# Patient Record
Sex: FEMALE | Race: WHITE | NOT HISPANIC OR LATINO | Employment: FULL TIME | URBAN - METROPOLITAN AREA
[De-identification: names, ages, dates, MRNs, and addresses within clinical notes are randomized per-mention and may not be internally consistent; named-entity substitution may affect disease eponyms.]

---

## 2023-06-11 PROBLEM — R79.89 LOW VITAMIN B12 LEVEL: Status: ACTIVE | Noted: 2023-06-11

## 2023-06-11 PROBLEM — K50.90 CROHN'S DISEASE (MULTI): Status: ACTIVE | Noted: 2022-09-07

## 2023-06-11 PROBLEM — R06.02 SHORTNESS OF BREATH: Status: ACTIVE | Noted: 2023-06-11

## 2023-06-11 PROBLEM — J34.2 DEVIATED NASAL SEPTUM: Status: ACTIVE | Noted: 2022-07-19

## 2023-06-11 PROBLEM — J30.9 ALLERGIC RHINITIS: Status: ACTIVE | Noted: 2022-09-07

## 2023-06-11 PROBLEM — O99.013 ANEMIA OF PREGNANCY IN THIRD TRIMESTER (HHS-HCC): Status: ACTIVE | Noted: 2023-04-19

## 2023-06-11 PROBLEM — K64.0 GRADE I HEMORRHOIDS: Status: ACTIVE | Noted: 2022-09-07

## 2023-06-11 PROBLEM — E88.09 HYPERPROTEINEMIA: Status: ACTIVE | Noted: 2023-06-11

## 2023-06-11 PROBLEM — Z86.39 H/O IRON DEFICIENCY: Status: ACTIVE | Noted: 2023-06-11

## 2023-06-11 PROBLEM — Z86.2 HISTORY OF ANEMIA: Status: ACTIVE | Noted: 2023-06-11

## 2023-06-11 PROBLEM — Z00.00 ROUTINE ADULT HEALTH MAINTENANCE: Status: ACTIVE | Noted: 2023-06-11

## 2023-06-11 PROBLEM — E55.9 VITAMIN D DEFICIENCY: Status: ACTIVE | Noted: 2018-01-09

## 2023-06-11 PROBLEM — F41.9 ANXIETY: Status: ACTIVE | Noted: 2017-05-05

## 2023-06-11 PROBLEM — R31.21 ASYMPTOMATIC MICROSCOPIC HEMATURIA: Status: ACTIVE | Noted: 2022-09-07

## 2023-07-10 ENCOUNTER — OFFICE VISIT (OUTPATIENT)
Dept: PRIMARY CARE | Facility: CLINIC | Age: 28
End: 2023-07-10
Payer: COMMERCIAL

## 2023-07-10 VITALS
SYSTOLIC BLOOD PRESSURE: 112 MMHG | HEIGHT: 69 IN | OXYGEN SATURATION: 99 % | HEART RATE: 70 BPM | BODY MASS INDEX: 23.55 KG/M2 | TEMPERATURE: 97.9 F | DIASTOLIC BLOOD PRESSURE: 75 MMHG | WEIGHT: 159 LBS

## 2023-07-10 DIAGNOSIS — R79.89 LOW VITAMIN B12 LEVEL: ICD-10-CM

## 2023-07-10 DIAGNOSIS — Z00.00 ROUTINE ADULT HEALTH MAINTENANCE: Primary | ICD-10-CM

## 2023-07-10 DIAGNOSIS — Z86.39 H/O IRON DEFICIENCY: ICD-10-CM

## 2023-07-10 DIAGNOSIS — K50.90 CROHN'S DISEASE WITHOUT COMPLICATION, UNSPECIFIED GASTROINTESTINAL TRACT LOCATION (MULTI): ICD-10-CM

## 2023-07-10 DIAGNOSIS — E55.9 VITAMIN D DEFICIENCY: ICD-10-CM

## 2023-07-10 DIAGNOSIS — L30.9 ECZEMA, UNSPECIFIED TYPE: ICD-10-CM

## 2023-07-10 PROBLEM — J34.2 DEVIATED NASAL SEPTUM: Status: RESOLVED | Noted: 2022-07-19 | Resolved: 2023-07-10

## 2023-07-10 PROBLEM — E88.09 HYPERPROTEINEMIA: Status: RESOLVED | Noted: 2023-06-11 | Resolved: 2023-07-10

## 2023-07-10 PROBLEM — R06.02 SHORTNESS OF BREATH: Status: RESOLVED | Noted: 2023-06-11 | Resolved: 2023-07-10

## 2023-07-10 PROBLEM — O99.013 ANEMIA OF PREGNANCY IN THIRD TRIMESTER (HHS-HCC): Status: RESOLVED | Noted: 2023-04-19 | Resolved: 2023-07-10

## 2023-07-10 PROCEDURE — 99395 PREV VISIT EST AGE 18-39: CPT | Performed by: INTERNAL MEDICINE

## 2023-07-10 PROCEDURE — 1036F TOBACCO NON-USER: CPT | Performed by: INTERNAL MEDICINE

## 2023-07-10 RX ORDER — NORGESTIMATE AND ETHINYL ESTRADIOL 7DAYSX3 28
1 KIT ORAL DAILY
COMMUNITY
End: 2023-07-10 | Stop reason: ALTCHOICE

## 2023-07-10 RX ORDER — ADALIMUMAB 40MG/0.4ML
40 KIT SUBCUTANEOUS
COMMUNITY

## 2023-07-10 RX ORDER — FLUOCINONIDE 0.5 MG/G
1 CREAM TOPICAL 2 TIMES DAILY
COMMUNITY
Start: 2021-04-20 | End: 2023-07-10 | Stop reason: SDUPTHER

## 2023-07-10 RX ORDER — ACETAMINOPHEN 500 MG
50 TABLET ORAL DAILY
COMMUNITY
Start: 2022-04-21

## 2023-07-10 RX ORDER — CLINDAMYCIN PHOSPHATE 10 MG/G
1 AEROSOL, FOAM TOPICAL DAILY
COMMUNITY
Start: 2020-11-24 | End: 2024-04-15 | Stop reason: ALTCHOICE

## 2023-07-10 RX ORDER — FLUOCINONIDE 0.5 MG/G
1 CREAM TOPICAL 2 TIMES DAILY
Qty: 30 G | Refills: 1 | Status: SHIPPED | OUTPATIENT
Start: 2023-07-10 | End: 2024-04-15 | Stop reason: WASHOUT

## 2023-07-10 RX ORDER — HYOSCYAMINE SULFATE 0.12 MG/1
125 TABLET SUBLINGUAL EVERY 4 HOURS PRN
COMMUNITY

## 2023-07-10 RX ORDER — ALBUTEROL SULFATE 90 UG/1
2 AEROSOL, METERED RESPIRATORY (INHALATION) EVERY 4 HOURS PRN
COMMUNITY
Start: 2021-04-20

## 2023-07-10 RX ORDER — OXYCODONE HYDROCHLORIDE 5 MG/1
5 TABLET ORAL EVERY 4 HOURS PRN
COMMUNITY
Start: 2023-05-12 | End: 2023-07-10 | Stop reason: ALTCHOICE

## 2023-07-10 ASSESSMENT — PATIENT HEALTH QUESTIONNAIRE - PHQ9
SUM OF ALL RESPONSES TO PHQ9 QUESTIONS 1 AND 2: 0
2. FEELING DOWN, DEPRESSED OR HOPELESS: NOT AT ALL
1. LITTLE INTEREST OR PLEASURE IN DOING THINGS: NOT AT ALL

## 2023-07-10 NOTE — PROGRESS NOTES
"Reason for Visit: Annual Physical Exam    Assessment and Plan:  Problem List Items Addressed This Visit          High    Routine adult health maintenance - Primary    Overview     Moderna COVID 19 vaccine 12/30/20, 1/29/21, 10/28/21, 5/25/22  TDAP 4/20/21, 3/8/23  Human Papillomavirus 9-valent Vaccine, [id number]/20/2017, 7/26/2017, 6/16/2017    Influenza Seasonal 10/16/2015, 10/1/21, 10/1/22         Cscope 7/19 (1-3yrs); 1/22 (2yrs)  MMR 10/28/2015  Hep C Ab (-) 2018  PAP 6/9/20 (-), 6/28/23 (-) sees GYN [facility]         Current Assessment & Plan     Annual Wellness exam completed   Preventive Health history reviewed:  Vaccines today: none  Labs ordered    Cscope managed by GI [facility]  Depression Screening done            Medium    Crohn's disease (CMS/HCC)    Overview     Managed by GI MD in [place], [place] in past  Managed by GI, Dr Fair ([facility])  On Humira. (Previously on Imuran)  Last Cscope 1/22 (-)  CT enterography 11/20 (-)  MRI 1/22: Short segment of active inflammation involving the terminal ileum. No stricture. Posterior midline transsphincteric perianal fistula with no branching or associated abscess.;           H/O iron deficiency    Overview      11/20: Level 36, 9%sat, TIBC 421 ([place])      Failed PO iron       Infusions in past         Low vitamin B12 level    Overview     11/20: 301  On MVIT now  has had IM B12 in past (doesnt tolerate oral supplement well)         Vitamin D deficiency    Overview     Formatting of this note might be different from the original. Comment on above: 11/20 37On supplement; Comment on above: 11/20 37On supplement;          HPI: Saw ENT and Pulm last year  S/p septoplasty and symptoms got better  Then got pregnant  Symptoms better now    ROS otherwise negative aside from what was mentioned above in HPI.    Vitals  /75   Pulse 70   Temp 36.6 °C (97.9 °F)   Ht 1.753 m (5' 9\")   Wt 72.1 kg (159 lb)   SpO2 99%   BMI 23.48 kg/m²   Body mass index is 23.48 kg/m².  Physical " Exam  Gen: Alert, NAD  HEENT:  Normal exam  Neck:  No masses/nodes palpable; Thyroid nontender and without nodules; No CARLEE  Respiratory:  Lungs CTAB  CV: RRR  Neuro:  Gross motor and sensory intact  Skin:  No suspicious lesions present, has raised red rash on right lateral foot and right palm, not pruritic  Breast: No masses or axillary lymphadenopathy  GYN deferred    Active Problem List  Patient Active Problem List   Diagnosis    Routine adult health maintenance    Allergic rhinitis    Anemia of pregnancy in third trimester    Anxiety    Asymptomatic microscopic hematuria    Crohn's disease (CMS/HCC)    Hyperproteinemia    Grade I hemorrhoids    History of anemia    Low vitamin B12 level    Vitamin D deficiency    H/O iron deficiency       Comprehensive Medical/Surgical/Social/Family History  Past Medical History:   Diagnosis Date    H/O chest x-ray     4/21: NORMAL    H/O CT scan     11/2020 [facility] CT enterography: negative    H/O CT scan of chest     8/2/21: 1. Normal study    H/O diagnostic ultrasound     5/27/21: US kidney: IMPRESSION:     Negative ultrasound of the kidneys    H/O magnetic resonance imaging     1/22: Short segment of active inflammation involving the terminal ileum. No stricture.      Posterior midline transsphincteric perianal fistula with no branching or associated     abscess.    H/O pelvic ultrasound     2017:  normal    History of PFTs     2021: Normal, DLCO mildly elevated. assisted (-)     Past Surgical History:   Procedure Laterality Date    COLONOSCOPY  04/21/2022 1/22: The entire examined colon is normal.           - Erythematous mucosa at the ileocecal valve with           single erosion. Biopsied.           - Nodularity with singe aphthae in the terminal           ileum.           - Simple Endoscopic Score for Crohn's Disease: 3,           mucosal inflammatory changes.   Overall, endoscopic remission on Humira 40 mg weekly with good levels 7/19: Normal    CYSTOSCOPY   2021:      Cystoscopy was completed today without complication and she tolerated the procedure     well. The introitus and urethra are normal. Careful inspection of the bladder     revealed there to be no evidence of tumor, stones, foreign bodies or diverticula. There     is clear efflux from each orifice. As she has negative workup, she will repeat a     UA in 1 year with her PCP to monitor    NASAL ENDOSCOPY  2022: , rigid endoscopy was performed using a 0degree endoscope. The septum was     deviated. There was anterior septal and inferior turbinate crusting, but there was no     crusting further back. Inferior turbinates were hypertrophic bilaterally. Both MT were in     good position and the middle meatii were clear. No mucus, pus or polyps were     seen    OTHER SURGICAL HISTORY  2020    [place] tooth extraction    OTHER SURGICAL HISTORY  2020    s/p Crohn's abscess drained    SEPTOPLASTY       Social History     Social History Narrative     (Hammad Callahan)    1 son    PT at [facility]    Nonsmoker    Social ETOH    ---    Family History:    F: Crohns, Melanoma    M: Lung CA small cell ( 32)    S: Anxiety/Depression    B: Crohns       Allergies and Medications  Patient has no known allergies.  Current Outpatient Medications   Medication Instructions    albuterol 90 mcg/actuation inhaler 2 puffs, inhalation, Every 4 hours PRN    cholecalciferol (VITAMIN D-3) 50 mcg, oral, Daily    clindamycin phosphate foam 1 Application, Topical, Daily    fluocinonide 0.05 % cream 1 Application, Topical, 2 times daily    Humira(CF) 40 mg, subcutaneous    hyoscyamine (LEVSIN/SL) 125 mcg, sublingual, Every 4 hours PRN    oxyCODONE (ROXICODONE) 5 mg, oral, Every 4 hours PRN    -iron fum-folic acid 29 mg iron- 1 mg tablet 1 tablet, oral, Daily

## 2023-07-12 ENCOUNTER — TELEPHONE (OUTPATIENT)
Dept: PRIMARY CARE | Facility: CLINIC | Age: 28
End: 2023-07-12
Payer: COMMERCIAL

## 2023-11-01 ENCOUNTER — PATIENT MESSAGE (OUTPATIENT)
Dept: PRIMARY CARE | Facility: CLINIC | Age: 28
End: 2023-11-01
Payer: COMMERCIAL

## 2023-11-01 DIAGNOSIS — R31.21 ASYMPTOMATIC MICROSCOPIC HEMATURIA: Primary | ICD-10-CM

## 2024-04-15 ENCOUNTER — OFFICE VISIT (OUTPATIENT)
Dept: PRIMARY CARE | Facility: CLINIC | Age: 29
End: 2024-04-15
Payer: COMMERCIAL

## 2024-04-15 VITALS
SYSTOLIC BLOOD PRESSURE: 96 MMHG | WEIGHT: 141 LBS | TEMPERATURE: 97.9 F | HEIGHT: 68 IN | BODY MASS INDEX: 21.37 KG/M2 | DIASTOLIC BLOOD PRESSURE: 63 MMHG | OXYGEN SATURATION: 97 % | HEART RATE: 68 BPM

## 2024-04-15 DIAGNOSIS — B35.3 TINEA PEDIS OF BOTH FEET: ICD-10-CM

## 2024-04-15 DIAGNOSIS — L01.00 IMPETIGO: Primary | ICD-10-CM

## 2024-04-15 PROCEDURE — 99213 OFFICE O/P EST LOW 20 MIN: CPT | Performed by: NURSE PRACTITIONER

## 2024-04-15 PROCEDURE — 1036F TOBACCO NON-USER: CPT | Performed by: NURSE PRACTITIONER

## 2024-04-15 RX ORDER — ERYTHROMYCIN 5 MG/G
OINTMENT OPHTHALMIC
COMMUNITY
Start: 2024-04-06

## 2024-04-15 RX ORDER — KETOCONAZOLE 20 MG/G
CREAM TOPICAL 2 TIMES DAILY
Qty: 30 G | Refills: 2 | Status: SHIPPED | OUTPATIENT
Start: 2024-04-15 | End: 2025-04-15

## 2024-04-15 RX ORDER — CEPHALEXIN 250 MG/1
250 CAPSULE ORAL 4 TIMES DAILY
Qty: 28 CAPSULE | Refills: 0 | Status: SHIPPED | OUTPATIENT
Start: 2024-04-15 | End: 2024-04-22

## 2024-04-15 RX ORDER — MUPIROCIN 20 MG/G
OINTMENT TOPICAL 3 TIMES DAILY
Qty: 22 G | Refills: 0 | Status: SHIPPED | OUTPATIENT
Start: 2024-04-15 | End: 2024-04-25

## 2024-04-15 ASSESSMENT — PATIENT HEALTH QUESTIONNAIRE - PHQ9
1. LITTLE INTEREST OR PLEASURE IN DOING THINGS: NOT AT ALL
2. FEELING DOWN, DEPRESSED OR HOPELESS: NOT AT ALL
SUM OF ALL RESPONSES TO PHQ9 QUESTIONS 1 AND 2: 0

## 2024-04-15 NOTE — PROGRESS NOTES
"Subjective   Patient ID: Lorrie Callahan is a 28 y.o. female who presents for Sick Visit (/).    Bilateral Rash behind the ears x 2 weeks  Very itchy, discharge   Yellow on pillow  Winter time had cracked   dry skin   used Aquaphor, neosporin  Neosporin seemed to help  Breastfeeding  Son has infected salivary gland    Rash on feet  Itchy  Derm thought maybe eczema  Didn't give any cream    On humira        Review of Systems  ROS completely negative except what was mentioned in the HPI.  Problem List, surgical, social, and family histories which were reviewed and updated as necessary within the EMR. I also personally reviewed the notes, labs, and imaging that pertained to what was documented or discussed in the HPI.    Objective   Physical Exam  Vitals and nursing note reviewed.   Constitutional:       General: She is not in acute distress.     Appearance: Normal appearance. She is normal weight.   HENT:      Head: Normocephalic and atraumatic.      Right Ear: External ear normal.      Left Ear: External ear normal.      Nose: Nose normal.      Mouth/Throat:      Mouth: Mucous membranes are moist.   Eyes:      Extraocular Movements: Extraocular movements intact.      Conjunctiva/sclera: Conjunctivae normal.   Pulmonary:      Effort: Pulmonary effort is normal.   Musculoskeletal:      Cervical back: Normal range of motion and neck supple.   Skin:     Comments: Erythematous rash with yellow crust on bilateral ears;  Erythematous rash on bilateral lateral feet with scaling   Neurological:      General: No focal deficit present.      Mental Status: She is alert and oriented to person, place, and time. Mental status is at baseline.   Psychiatric:         Mood and Affect: Mood normal.         Behavior: Behavior normal.         Thought Content: Thought content normal.         Judgment: Judgment normal.         BP 96/63 (BP Location: Left arm)   Pulse 68   Temp 36.6 °C (97.9 °F) (Temporal)   Ht 1.727 m (5' 8\")   Wt " 64 kg (141 lb)   SpO2 97%   BMI 21.44 kg/m²     Assessment/Plan    Problem List Items Addressed This Visit    None  Visit Diagnoses       Impetigo    -  Primary    Relevant Medications    cephalexin (Keflex) 250 mg capsule    mupirocin (Bactroban) 2 % ointment    Tinea pedis of both feet        Relevant Medications    ketoconazole (NIZOral) 2 % cream

## 2024-04-30 ENCOUNTER — APPOINTMENT (OUTPATIENT)
Dept: PRIMARY CARE | Facility: CLINIC | Age: 29
End: 2024-04-30
Payer: COMMERCIAL

## 2024-07-15 ENCOUNTER — TELEPHONE (OUTPATIENT)
Dept: PRIMARY CARE | Facility: CLINIC | Age: 29
End: 2024-07-15
Payer: COMMERCIAL

## 2024-07-15 NOTE — TELEPHONE ENCOUNTER
Spoke with patient and relayed message regarding PT through Brittni Giordano @ [employer].  Patient verbally understood.

## 2024-07-29 ENCOUNTER — OFFICE VISIT (OUTPATIENT)
Dept: PRIMARY CARE | Facility: CLINIC | Age: 29
End: 2024-07-29
Payer: COMMERCIAL

## 2024-07-29 VITALS
TEMPERATURE: 99.8 F | DIASTOLIC BLOOD PRESSURE: 73 MMHG | BODY MASS INDEX: 21.47 KG/M2 | HEART RATE: 87 BPM | SYSTOLIC BLOOD PRESSURE: 104 MMHG | WEIGHT: 141.2 LBS | OXYGEN SATURATION: 97 %

## 2024-07-29 DIAGNOSIS — J06.9 UPPER RESPIRATORY TRACT INFECTION, UNSPECIFIED TYPE: Primary | ICD-10-CM

## 2024-07-29 LAB — POC RAPID STREP: NEGATIVE

## 2024-07-29 PROCEDURE — 87081 CULTURE SCREEN ONLY: CPT

## 2024-07-29 PROCEDURE — 99213 OFFICE O/P EST LOW 20 MIN: CPT | Performed by: NURSE PRACTITIONER

## 2024-07-29 PROCEDURE — 1036F TOBACCO NON-USER: CPT | Performed by: NURSE PRACTITIONER

## 2024-07-29 PROCEDURE — 87635 SARS-COV-2 COVID-19 AMP PRB: CPT

## 2024-07-29 PROCEDURE — 87880 STREP A ASSAY W/OPTIC: CPT | Performed by: NURSE PRACTITIONER

## 2024-07-29 RX ORDER — BENZONATATE 100 MG/1
100 CAPSULE ORAL 3 TIMES DAILY PRN
Qty: 30 CAPSULE | Refills: 0 | Status: SHIPPED | OUTPATIENT
Start: 2024-07-29 | End: 2024-08-08

## 2024-07-29 RX ORDER — FLUTICASONE PROPIONATE 50 MCG
1 SPRAY, SUSPENSION (ML) NASAL DAILY
Qty: 16 G | Refills: 5 | Status: SHIPPED | OUTPATIENT
Start: 2024-07-29 | End: 2024-07-30 | Stop reason: SDUPTHER

## 2024-07-29 NOTE — PROGRESS NOTES
"Problem List Items Addressed This Visit    None  Visit Diagnoses       Upper respiratory tract infection, unspecified type    -  Primary    rapid strep neg  with hypertrophy sent cx to confirm  covid sent  - paxlovid if pos (no breastfeeding with; msg pharm)  cont conserv mgt given short duration sx    Relevant Medications    benzonatate (Tessalon) 100 mg capsule    fluticasone (Flonase) 50 mcg/actuation nasal spray    Other Relevant Orders    POCT Rapid Strep A manually resulted (Completed)    Group A Streptococcus, Culture    Sars-CoV-2 PCR             Subjective   Patient ID: Lorrie Callahan is a 28 y.o. female who presents for Sick Visit (Cough /congestion x 4 days ear pain right more than left/Last night took robitussin advil on and off she is breast feeding /Took covid test Wednesday night was negative/).  HPI   was ill last week  Son runny nose  Family with colds  Everyone coming back covid negative  Hasn't used albuterol  Dyspnea      Review of Systems   All other systems reviewed and are negative.      BP Readings from Last 3 Encounters:   07/29/24 104/73   04/15/24 96/63   07/10/23 112/75      Wt Readings from Last 3 Encounters:   07/29/24 64 kg (141 lb 3.2 oz)   04/15/24 64 kg (141 lb)   07/10/23 72.1 kg (159 lb)      BMI:   Estimated body mass index is 21.47 kg/m² as calculated from the following:    Height as of 4/15/24: 1.727 m (5' 8\").    Weight as of this encounter: 64 kg (141 lb 3.2 oz).    Objective   Physical Exam  Constitutional:       General: She is not in acute distress.  HENT:      Head: Normocephalic and atraumatic.      Right Ear: Tympanic membrane and external ear normal.      Left Ear: Tympanic membrane and external ear normal.      Nose: Nose normal.      Mouth/Throat:      Mouth: Mucous membranes are moist.      Pharynx: Oropharyngeal exudate and posterior oropharyngeal erythema present.      Comments: 2+ bilat hypertrophy   Eyes:      Extraocular Movements: Extraocular " movements intact.      Conjunctiva/sclera: Conjunctivae normal.   Cardiovascular:      Rate and Rhythm: Normal rate and regular rhythm.      Pulses: Normal pulses.   Pulmonary:      Effort: Pulmonary effort is normal.      Breath sounds: Normal breath sounds.      Comments: Frequent cough   Musculoskeletal:         General: Normal range of motion.      Cervical back: Normal range of motion and neck supple.   Skin:     General: Skin is warm and dry.   Neurological:      General: No focal deficit present.      Mental Status: She is alert.   Psychiatric:         Mood and Affect: Mood normal.

## 2024-07-30 ENCOUNTER — APPOINTMENT (OUTPATIENT)
Dept: PRIMARY CARE | Facility: CLINIC | Age: 29
End: 2024-07-30
Payer: COMMERCIAL

## 2024-07-30 DIAGNOSIS — J06.9 UPPER RESPIRATORY TRACT INFECTION, UNSPECIFIED TYPE: ICD-10-CM

## 2024-07-30 LAB — SARS-COV-2 RNA RESP QL NAA+PROBE: NOT DETECTED

## 2024-07-30 RX ORDER — FLUTICASONE PROPIONATE 50 MCG
1 SPRAY, SUSPENSION (ML) NASAL DAILY
Qty: 48 G | Refills: 3 | Status: SHIPPED | OUTPATIENT
Start: 2024-07-30 | End: 2025-07-30

## 2024-07-31 LAB — S PYO THROAT QL CULT: NORMAL

## 2024-08-29 RX ORDER — DESONIDE 0.5 MG/G
1 CREAM TOPICAL 2 TIMES DAILY
COMMUNITY
Start: 2024-05-03

## 2024-08-29 RX ORDER — TACROLIMUS 1 MG/G
1 OINTMENT TOPICAL 2 TIMES DAILY
COMMUNITY
Start: 2024-05-20

## 2024-08-29 RX ORDER — ADALIMUMAB-ADAZ 40 MG/.4ML
40 INJECTION, SOLUTION SUBCUTANEOUS WEEKLY
COMMUNITY
Start: 2024-05-13 | End: 2025-05-13

## 2024-08-29 RX ORDER — CLINDAMYCIN PHOSPHATE 10 MG/G
1 AEROSOL, FOAM TOPICAL 3 TIMES WEEKLY
COMMUNITY
Start: 2024-04-17

## 2024-08-29 RX ORDER — TRIAMCINOLONE ACETONIDE 1 MG/G
CREAM TOPICAL
COMMUNITY
Start: 2024-07-18

## 2024-08-29 RX ORDER — CLOBETASOL PROPIONATE 0.5 MG/G
1 OINTMENT TOPICAL 2 TIMES DAILY
COMMUNITY
Start: 2024-04-17

## 2024-08-30 ENCOUNTER — APPOINTMENT (OUTPATIENT)
Dept: PRIMARY CARE | Facility: CLINIC | Age: 29
End: 2024-08-30
Payer: COMMERCIAL

## 2024-08-30 ENCOUNTER — LAB (OUTPATIENT)
Dept: LAB | Facility: LAB | Age: 29
End: 2024-08-30
Payer: COMMERCIAL

## 2024-08-30 VITALS
TEMPERATURE: 98.1 F | DIASTOLIC BLOOD PRESSURE: 56 MMHG | SYSTOLIC BLOOD PRESSURE: 88 MMHG | WEIGHT: 141 LBS | BODY MASS INDEX: 20.19 KG/M2 | HEIGHT: 70 IN | HEART RATE: 71 BPM | OXYGEN SATURATION: 99 %

## 2024-08-30 DIAGNOSIS — J30.9 ALLERGIC RHINITIS, UNSPECIFIED SEASONALITY, UNSPECIFIED TRIGGER: ICD-10-CM

## 2024-08-30 DIAGNOSIS — M54.6 THORACIC SPINE PAIN: ICD-10-CM

## 2024-08-30 DIAGNOSIS — Z00.00 ROUTINE ADULT HEALTH MAINTENANCE: Primary | ICD-10-CM

## 2024-08-30 DIAGNOSIS — R31.21 ASYMPTOMATIC MICROSCOPIC HEMATURIA: ICD-10-CM

## 2024-08-30 DIAGNOSIS — M54.2 CERVICAL PAIN (NECK): ICD-10-CM

## 2024-08-30 DIAGNOSIS — K50.90 CROHN'S DISEASE WITHOUT COMPLICATION, UNSPECIFIED GASTROINTESTINAL TRACT LOCATION (MULTI): ICD-10-CM

## 2024-08-30 DIAGNOSIS — L40.9 PSORIASIS: ICD-10-CM

## 2024-08-30 LAB
APPEARANCE UR: CLEAR
BILIRUB UR STRIP.AUTO-MCNC: NEGATIVE MG/DL
COLOR UR: COLORLESS
GLUCOSE UR STRIP.AUTO-MCNC: NORMAL MG/DL
HOLD SPECIMEN: NORMAL
KETONES UR STRIP.AUTO-MCNC: NEGATIVE MG/DL
LEUKOCYTE ESTERASE UR QL STRIP.AUTO: NEGATIVE
NITRITE UR QL STRIP.AUTO: NEGATIVE
PH UR STRIP.AUTO: 6.5 [PH]
PROT UR STRIP.AUTO-MCNC: NEGATIVE MG/DL
RBC # UR STRIP.AUTO: NEGATIVE /UL
SP GR UR STRIP.AUTO: 1
UROBILINOGEN UR STRIP.AUTO-MCNC: NORMAL MG/DL

## 2024-08-30 PROCEDURE — 1036F TOBACCO NON-USER: CPT | Performed by: INTERNAL MEDICINE

## 2024-08-30 PROCEDURE — 3008F BODY MASS INDEX DOCD: CPT | Performed by: INTERNAL MEDICINE

## 2024-08-30 PROCEDURE — 99395 PREV VISIT EST AGE 18-39: CPT | Performed by: INTERNAL MEDICINE

## 2024-08-30 PROCEDURE — 81003 URINALYSIS AUTO W/O SCOPE: CPT

## 2024-08-30 RX ORDER — CYCLOBENZAPRINE HCL 10 MG
10 TABLET ORAL 3 TIMES DAILY PRN
Qty: 30 TABLET | Refills: 0 | Status: SHIPPED | OUTPATIENT
Start: 2024-08-30 | End: 2024-10-29

## 2024-08-30 ASSESSMENT — PATIENT HEALTH QUESTIONNAIRE - PHQ9
SUM OF ALL RESPONSES TO PHQ9 QUESTIONS 1 AND 2: 0
1. LITTLE INTEREST OR PLEASURE IN DOING THINGS: NOT AT ALL
2. FEELING DOWN, DEPRESSED OR HOPELESS: NOT AT ALL

## 2024-08-30 NOTE — PATIENT INSTRUCTIONS
Gallieri Test/Grail test  Tests for these cancers:  Adrenal Cortical Carcinoma  Ampulla of Vater  Anus  Appendix, Carcinoma  Bile Ducts, Distal  Bile Ducts, Intrahepatic  Bile Ducts, Perihilar  Bladder, Urinary  Bone  Breast  Cervix  Colon and Rectum  Esophagus and Esophagogastric Junction  Gallbladder  Gastrointestinal Stromal Tumor  Gestational Trophoblastic Neoplasms  Kidney  Larynx  Leukemia  Liver  Lung  Lymphoma (Hodgkin and Non-Hodgkin)  Melanoma of the Skin  Merkel Cell Carcinoma  Mesothelioma, Malignant Pleural  Nasal Cavity and Paranasal Sinuses Nasopharynx  Neuroendocrine Tumors of the Appendix  Neuroendocrine Tumors of the Colon and Rectum  Neuroendocrine Tumors of the Pancreas  Oral Cavity  Oropharynx (HPV-Mediated, p16+)  Oropharynx (p16-) and Hypopharynx  Ovary, Fallopian Tube and Primary Peritoneum  Pancreas, exocrine  Penis  Plasma Cell Myeloma and Plasma Cell Disorders  Prostate  Small Intestine  Soft Tissue Sarcoma of the Abdomen and Thoracic Visceral Organs  Soft Tissue Sarcoma of the Head and Neck  Soft Tissue Sarcoma of the Retroperitoneum  Soft Tissue Sarcoma of the Trunk and Extremities  Soft Tissue Sarcoma Unusual Histologies and Sites  Stomach  Testis  Ureter, Renal Pelvis  Uterus, Carcinoma and Carcinosarcoma  Uterus, Sarcoma  Vagina  Vulva

## 2024-08-30 NOTE — PROGRESS NOTES
ANNUAL CPE VISIT  Chief Complaint   Patient presents with    Annual Exam     HPI: Has Crohns, had scope done and MRI abd  Reviewed  Stil having chest pain and neck pain  Scaleness are tight  Anterior chest is tender  Ribs are rotated  Saw PT and Chiro, had XRAYS neck and chest  Doesnt think is TOS  Constant and persistent pain  Thoracic spine rotation also, pain on left  Family hx malgnany young age    Assessment and Plan:  Problem List Items Addressed This Visit          High    Routine adult health maintenance - Primary    Overview     Moderna COVID 19 vaccine 12/30/20, 1/29/21, 10/28/21, 5/25/22, 11/20/23  TDAP 4/20/21, 3/8/23  Human Papillomavirus 9-valent Vaccine, [id number]/20/2017, 7/26/2017, 6/16/2017    Influenza Seasonal 10/16/2015, 10/1/21, 10/1/22         Cscope 7/19 (1-3yrs); 1/22 (2yrs)  MMR 10/28/2015  Hep C Ab (-) 2018  PAP 6/9/20 (-), 6/28/23 (-) sees GYN [facility]         Current Assessment & Plan     Annual Wellness exam completed   Preventive Health History reviewed  Ordered:   Labs                Medium    Allergic rhinitis    Overview     On Clairitn/Zyrtec;         Asymptomatic microscopic hematuria    Overview     5/21 US kidney (-)  6/21 Cystoscopy (-)  Saw Urology, rec'd annual UA         Relevant Orders    Urinalysis with Reflex Microscopic    Crohn's disease (Multi)    Overview     Managed by GI MD in [place], [place] in past  Managed by GI, Dr Fair ([facility])  On Humira. (Previously on Imuran)  Last Cscope 1/22 (-)  CT enterography 11/20 (-)  MRI 1/22: Short segment of active inflammation involving the terminal ileum. No stricture. Posterior midline transsphincteric perianal fistula with no branching or associated abscess.  Flex Sig 5/24: Perianal fistula found on perianal exam.                          - Eroded and granular mucosa in the rectum.                          Biopsied.: Chronic mildly active colitis                         - Aphtha in the ileum, 5 cm from the ileocecal                     "      valve. Biopsied. Chronic inactive enteritis                         - Simple Endoscopic Score for Crohn's Disease: 4,                          mucosal inflammatory changes secondary to Crohn's                          disease, with ileitis and colitis.          Psoriasis    Overview     Sees Derm  Topical agents          Other Visit Diagnoses       Cervical pain (neck)        Would avoid NSAIDs  prn muscle relaxant  MRI if sx persist    Relevant Medications    cyclobenzaprine (Flexeril) 10 mg tablet    Other Relevant Orders    MR cervical spine wo IV contrast    Thoracic spine pain        Rib subluxation? as cause for pain  vs  Thoracic spine rotation  XRAys normal  Will get MRI    Relevant Orders    MR thoracic spine wo IV contrast              ROS otherwise negative aside from what was mentioned above in HPI.    Vitals  BP 88/56   Pulse 71   Temp 36.7 °C (98.1 °F)   Ht 1.765 m (5' 9.5\")   Wt 64 kg (141 lb)   SpO2 99%   BMI 20.52 kg/m²   Body mass index is 20.52 kg/m².  Physical Exam  Gen: Alert, NAD  HEENT:  Normal exam  Neck:  No masses/nodes palpable; Thyroid nontender and without nodules; No CARLEE  Respiratory:  Lungs CTAB  CV: RRR  Neuro:  Gross motor and sensory intact  Skin:  No suspicious lesions present  Breast: No masses or axillary lymphadenopathy      Allergies and Medications  Pollen extracts  Current Outpatient Medications   Medication Instructions    adalimumab-adaz 40 mg, subcutaneous, Weekly    albuterol 90 mcg/actuation inhaler 2 puffs, inhalation, Every 4 hours PRN    cholecalciferol (VITAMIN D-3) 50 mcg, oral, Daily    clindamycin phosphate foam 1 Application, Topical, 3 times weekly    clobetasol (Temovate) 0.05 % ointment 1 Application, Topical, 2 times daily    cyclobenzaprine (FLEXERIL) 10 mg, oral, 3 times daily PRN    desonide (DesOwen) 0.05 % cream 1 Application, Topical, 2 times daily, Take weekends off. Do not use on face, armpits, neck or groin.    fluticasone (Flonase) 50 " mcg/actuation nasal spray 1 spray, Each Nostril, Daily, Shake gently. Before first use, prime pump. After use, clean tip and replace cap.    hyoscyamine (LEVSIN/SL) 125 mcg, sublingual, Every 4 hours PRN    -iron fum-folic acid 29 mg iron- 1 mg tablet 1 tablet, oral, Daily    tacrolimus (Protopic) 0.1 % ointment 1 Application, Topical, 2 times daily, On days not using topical steroid.    triamcinolone (Kenalog) 0.1 % cream Apply to affected area twice daily as needed. Use 2 weeks on 1 week off. Not for face, armpits or groin.       Active Problem List  Patient Active Problem List   Diagnosis    Routine adult health maintenance    Allergic rhinitis    Anxiety    Asymptomatic microscopic hematuria    Crohn's disease (Multi)    Grade I hemorrhoids    History of anemia    Low vitamin B12 level    Vitamin D deficiency    H/O iron deficiency    Psoriasis       Comprehensive Medical/Surgical/Social/Family History  Past Medical History:   Diagnosis Date    H/O chest x-ray     4/21: NORMAL    H/O CT scan     11/2020 [facility] CT enterography: negative    H/O CT scan of chest     8/2/21: 1. Normal study    H/O diagnostic ultrasound     5/27/21: US kidney: IMPRESSION:     Negative ultrasound of the kidneys    H/O magnetic resonance imaging     1/22: Short segment of active inflammation involving the terminal ileum. No stricture.      Posterior midline transsphincteric perianal fistula with no branching or associated     abscess.    H/O magnetic resonance imaging 07/2024    No active inflammatory small bowel Crohn's disease.    H/O pelvic ultrasound     2017:  normal    History of PFTs     2021: Normal, DLCO mildly elevated. pen[facility]ent[facility] (-)     Past Surgical History:   Procedure Laterality Date    COLONOSCOPY  04/21/2022 1/22: The entire examined colon is normal.           - Erythematous mucosa at the ileocecal valve with           single erosion. Biopsied.           - Nodularity with singe aphthae in the terminal            ileum.           - Simple Endoscopic Score for Crohn's Disease: 3,           mucosal inflammatory changes.   Overall, endoscopic remission on Humira 40 mg weekly with good levels 7/19: Normal    COLONOSCOPY  04/25/2024    Perianal fistula found on perianal exam.                         - Eroded and granular mucosa in the rectum.                         Biopsied.                         - Aphtha in the ileum, 5 cm from the ileocecal                         valve. Biopsied.                         - Simple Endoscopic Score for Crohn's Disease: 4,    CYSTOSCOPY  06/04/2021 6/21:      Cystoscopy was completed today without complication and she tolerated the procedure     well. The introitus and urethra are normal. Careful inspection of the bladder     revealed there to be no evidence of tumor, stones, foreign bodies or diverticula. There     is clear efflux from each orifice. As she has negative workup, she will repeat a     UA in 1 year with her PCP to monitor    FLEXIBLE SIGMOIDOSCOPY  05/06/2024    Perianal fistula found on perianal exam.                         - Eroded and granular mucosa in the rectum.                         Biopsied.                         - Aphtha in the ileum, 5 cm from the ileocecal                         valve. Biopsied.     - Simple Endoscopic Score for Crohn's Disease: 4, mucosal inflammatory changes secondary to Crohn's disease, with ileitis and colitis.    NASAL ENDOSCOPY  04/21/2022 2021: , rigid endoscopy was performed using a 0degree endoscope. The septum was     deviated. There was anterior septal and inferior turbinate crusting, but there was no     crusting further back. Inferior turbinates were hypertrophic bilaterally. Both MT were in     good position and the middle meatii were clear. No mucus, pus or polyps were     seen    OTHER SURGICAL HISTORY  01/13/2020    [place] tooth extraction    OTHER SURGICAL HISTORY  01/13/2020    s/p Crohn's abscess drained     How Severe Is Your Skin Lesion?: mild Have Your Skin Lesions Been Treated?: not been treated SEPTOPLASTY         Social History     Social History Narrative    Scial History:     (Hammad Callahan)    1 son    PT at [facility]    Nonsmoker    Social ETOH    ---    Family History:    F: Crohns, Melanoma    M: Lung CA small cell ( 32)    S: Anxiety/Depression    B: Crohns      Is This A New Presentation, Or A Follow-Up?: Skin Lesions

## 2024-09-27 PROBLEM — R04.0 EPISTAXIS: Status: ACTIVE | Noted: 2024-09-27

## 2024-11-04 PROBLEM — Z87.09 H/O PNEUMOTHORAX: Status: ACTIVE | Noted: 2024-11-04

## 2024-11-06 ENCOUNTER — PATIENT OUTREACH (OUTPATIENT)
Dept: PRIMARY CARE | Facility: CLINIC | Age: 29
End: 2024-11-06
Payer: COMMERCIAL

## 2024-11-06 NOTE — PROGRESS NOTES
Discharge facility: [facility]  Discharge diagnosis: Spontaneous pneumothorax   Admission date: 11/3/24  Discharge date: 11/5/24    PCP Appointment Date: Unsuccessful attempts x2 to reach patient for PCP Follow-up, message sent to office  Specialist Appointment Date: 12/4/24 pulmonologist  Hospital Encounter and Summary: Linked      Two attempts were made to reach patient within two business days after discharge. Voicemail left with contact information for patient to call back with any non-emergent questions or concerns.

## 2024-11-19 ENCOUNTER — APPOINTMENT (OUTPATIENT)
Dept: PRIMARY CARE | Facility: CLINIC | Age: 29
End: 2024-11-19
Payer: COMMERCIAL

## 2024-11-19 VITALS
WEIGHT: 142.2 LBS | SYSTOLIC BLOOD PRESSURE: 109 MMHG | TEMPERATURE: 98.3 F | DIASTOLIC BLOOD PRESSURE: 77 MMHG | OXYGEN SATURATION: 97 % | BODY MASS INDEX: 20.7 KG/M2 | HEART RATE: 83 BPM

## 2024-11-19 DIAGNOSIS — Z87.09 H/O PNEUMOTHORAX: Primary | ICD-10-CM

## 2024-11-19 PROCEDURE — 1036F TOBACCO NON-USER: CPT | Performed by: NURSE PRACTITIONER

## 2024-11-19 PROCEDURE — 99495 TRANSJ CARE MGMT MOD F2F 14D: CPT | Performed by: NURSE PRACTITIONER

## 2024-11-19 RX ORDER — ALBUTEROL SULFATE 90 UG/1
2 INHALANT RESPIRATORY (INHALATION) EVERY 4 HOURS PRN
Qty: 18 G | Refills: 11 | Status: SHIPPED | OUTPATIENT
Start: 2024-11-19 | End: 2025-11-19

## 2024-11-19 NOTE — PROGRESS NOTES
"Patient: Lorrie Callahan  : 1995  PCP: Shannon Lomax MD  MRN: [id number]  Program: No linked episodes    Admit date:  2024  Discharge date:  2024  Discharge DX:  Pneumothorax    Lorrie Callahan is a 29 y.o. female presenting today for follow-up after being discharged from the hospital 14 days ago. The main problem requiring admission was pneumothorax. The discharge summary and/or Transitional Care Management documentation was reviewed. Medication reconciliation was performed as indicated via the \"Ruel as Reviewed\" timestamp.     Lorrie Callahan was contacted by Transitional Care Management services two days after her discharge. This encounter and supporting documentation was reviewed.    The complexity of medical decision making for this patient's transitional care is moderate.    Still feeling slightly short of breath  CXR shows resolution of pneumothorax  In past used albuterol  And wants refill  Waiting for next menses  Feels shifting in lungs and SOB with menses  Has a son 18 month old  Has not been diagnosed with endometriosis  Doing incentive spirometer         Hospital course copied:     Lorrie Callahan is a 29 year old female presented with past medical history of shortness of breath and right sided chest discomfort for the past several months that correlate at the timing of her menses. CXR and CT imaging showed a small right sided pneumothorax. Pigtail was placed, pulmonary consulted, chest tube was clamped today and x-ray was repeated showed stable small apical pneumothorax, chest tube was removed, cleared for discharge by pulmonary and repeat x-ray in 1 week, to follow-up outpatient for pulmonary function test, patient will follow-up with GYN for evaluation of endometriosis, patient scheduled the appointment today remained clinically and vitally stable for discharge today.     Labs copied:    COMPLETE BLOOD COUNT  Order: [id number]  Component  Ref Range & Units 2 wk ago "   WBC  3.70 - 11.00 k/uL 5.89   RBC  3.90 - 5.20 m/uL 4.04   Hemoglobin  11.5 - 15.5 g/dL 11.8   Hematocrit  36.0 - 46.0 % 37.0   MCV  80.0 - 100.0 fL 91.6   MCH  26.0 - 34.0 pg 29.2   MCHC  30.5 - 36.0 g/dL 31.9   RDW-CV  11.5 - 15.0 % 12.5   Platelet Count  150 - 400 k/uL 227   MPV  9.0 - 12.7 fL 10.6   Absolute nRBC  <0.01 k/uL <0.01   Resulting Agency [facility]     Specimen Collected: 11/04/24 06:05                ntains abnormal data COMPREHENSIVE METABOLIC PANEL  Order: [id number]  Component  Ref Range & Units 2 wk ago   Protein, Total  6.3 - 8.0 g/dL 7.3   Albumin  3.9 - 4.9 g/dL 3.9   Calcium, Total  8.5 - 10.2 mg/dL 9.0   Bilirubin, Total  0.2 - 1.3 mg/dL 0.4   Alkaline Phosphatase  34 - 123 U/L 60   AST  13 - 35 U/L 14   ALT  7 - 38 U/L 7   Glucose  74 - 99 mg/dL 102 High    Comment: The American Diabetes Association (ADA) provides guidance for cutoff values for fasting glucose and random glucose. The ADA defines fasting as no caloric intake for at least 8 hours. Fasting plasma glucose results between 100 to 125 mg/dL indicate increased risk for diabetes (prediabetes).  Fasting plasma glucose results greater than or equal to 126 mg/dL meet the criteria for diagnosis of diabetes. In the absence of unequivocal hyperglycemia, results should be confirmed by repeat testing. In a patient with classic symptoms of hyperglycemia or hyperglycemic crisis, random plasma glucose results greater than or equal to 200 mg/dL meet the criteria for diagnosis of diabetes.  Reference: Standards of Medical Care in Diabetes 2016, American Diabetes Association. Diabetes Care. 2016.39(Suppl 1).   BUN  7 - 21 mg/dL 8   Creatinine  0.58 - 0.96 mg/dL 0.62   Sodium  136 - 144 mmol/L 139   Potassium  3.7 - 5.1 mmol/L 3.5 Low    Chloride  98 - 107 mmol/L 105   CO2  22 - 30 mmol/L 24   Anion Gap  8 - 15 mmol/L 10   Estimated Glomerular Filtration Rate  >=60 mL/min/1.73m² 122   Comment: Estimated Glomerular Filtration Rate  (eGFR) is calculated using the 2021 CKD-EPI creatinine equation. This equation utilizes serum creatinine, sex, and age as parameters. The creatinine assay has traceable calibration to isotope dilution-mass spectrometry. Refer to KDIGO guidelines for clinical interpretation. In patients with unstable renal function, e.g. those with acute kidney injury, the eGFR may not accurately reflect actual GFR.   Resulting Agency [facility]     Specimen Collected: 11/04/24 06:05              See additional [facility] labs under tab      Imaging copied:     CT angio chest w and wo IV contrast  Order: [id number]  Impression    IMPRESSION:    1.  Small right-sided pneumothorax.    2.  No pulmonary emboli.    3.  Trace right pleural effusion      : PSCB    Transcribe Date/Time: Nov  3 2024 12:26P    Dictated by : ANKUR GALLO MD    This examination was interpreted and the report reviewed and  electronically signed by:  ANKUR GALLO MD on Nov  3 2024 12:39PM  EST  Narrative    * * *Final Report* * *    DATE OF EXAM: Nov  3 2024 10:58AM      [facility]   0564  -  CTA CHEST (NON GATED) W IVCON PE  / ACCESSION #  [id number]    PROCEDURE REASON: Pulmonary embolism (PE) suspected, high prob        * * * * Physician Interpretation * * * *     EXAMINATION:  CHEST CTA (NON GATED) WITH CONTRAST (PULMONARY EMBOLISM  PROTOCOL)    Clinical History: Pulmonary embolism suspected    Technique:  Spiral CT acquisition of the chest from the thoracic inlet to  the upper abdomen following IV contrast.  Axial 1 and 3 mm thick slices  plus coronal and sagittal reformatted images.  MQ:  CTCP_5  Contrast:  80 mL Omnipaque 350 IV  CT Radiation dose: Integrated Dose-length product (DLP) for this visit =    241 mGy*cm  CT Dose Reduction Employed: Automated exposure control(AEC) and iterative  recon    CTA:  Post-processed images (Maximum intensity Projection (MIP),  Volume-rendered (VR), or Surface shaded display images (SSD))  were  created, reviewed and archived.      Comparison:  Chest x-ray 11/03/2024    RESULT:    Limitations:  None.    Evaluation for thromboembolic disease:       - Right heart chambers:  No thromboembolic disease.       - Main pulmonary arteries:  No thromboembolic disease.       - Lobar pulmonary arteries:  No thromboembolic disease.       - Segmental pulmonary arteries:  No thromboembolic disease.       - Subsegmental pulmonary arteries:  No thromboembolic disease.       - Additional pulmonary artery findings:  The main pulmonary artery  is normal in caliber.    Lines, tubes, and devices:  None.    Lung parenchyma and airways: Small right-sided pneumothorax.  No focal  consolidation.    Pleural space:  Trace right pleural effusion.    Lower neck, lymph nodes, and mediastinum:  The imaged thyroid gland is  normal.  No axillary, mediastinal, or hilar lymphadenopathy.  Small  amount of ill-defined soft tissue in anterior mediastinum, likely  residual thymic tissue.    Heart, pericardium, and thoracic vessels:  The thoracic aorta is normal  in caliber. The cardiac chambers are normal in size. No coronary artery  atherosclerotic calcifications are noted, although the study is not  optimized for coronary assessment. No pericardial effusion or thickening.    Bones and soft tissues:  No suspicious osseous lesions.    Upper abdomen:  No abnormality in the imaged upper abdomen.    Localizer images: No additional findings.  Exam End: 11/03/24 10:58    Specimen Collected: 11/03/24 10:58 Last Resulted: 11/03/24 12:41   Received From: [facility]  Result Received: 11/04/24 05:23       XR chest 1 view  Order: [id number]  Impression    IMPRESSION:    Small right apical pneumothorax is unchanged.      : QUINTON    Transcribe Date/Time: Nov 5 2024  2:30P    Dictated by : ANKUR GALLO MD    This examination was interpreted and the report reviewed and  electronically signed by:  ANKUR GALLO MD on Nov 5 2024   2:31PM  EST  Narrative    * * *Final Report* * *    DATE OF EXAM: Nov 5 2024  1:28PM      [facility]   5290  -  XR CHEST 1V FRONTAL   / ACCESSION #  [id number]    PROCEDURE REASON: Pneumothorax        * * * * Physician Interpretation * * * *     EXAMINATION: XR CHEST 1V FRONTAL    CLINICAL HISTORY: Pneumothorax      COMPARISON: X-ray 11/04/2024      RESULT:    Lines, tubes, and devices: Right-sided chest tube.    Lungs and pleura: Small right hydropneumothorax.  No pleural effusions or  focal consolidation.    Cardiomediastinal silhouette: Normal in size.    Other: No acute abnormality detected in the upper abdomen or visualized  bones.  Exam End: 11/05/24 13:28    Specimen Collected: 11/05/24 13:28 Last Resulted: 11/05/24 14:33   Received From: [facility]  Result Received: 11/13/24 16:17     Review of Systems  ROS completely negative except what was mentioned in the HPI.  Problem List, surgical, social, and family histories which were reviewed and updated as necessary within the EMR. I also personally reviewed the notes, labs, and imaging that pertained to what was documented or discussed in the HPI.    Physical Exam  Vitals and nursing note reviewed.   Constitutional:       General: She is not in acute distress.     Appearance: Normal appearance.   HENT:      Head: Normocephalic and atraumatic.      Right Ear: External ear normal.      Left Ear: External ear normal.      Nose: Nose normal.      Mouth/Throat:      Mouth: Mucous membranes are moist.   Eyes:      Extraocular Movements: Extraocular movements intact.      Conjunctiva/sclera: Conjunctivae normal.      Pupils: Pupils are equal, round, and reactive to light.   Cardiovascular:      Rate and Rhythm: Normal rate and regular rhythm.      Heart sounds: Normal heart sounds.   Pulmonary:      Effort: Pulmonary effort is normal.      Breath sounds: Normal breath sounds.   Musculoskeletal:         General: Normal range of motion.      Cervical back: Normal range  of motion and neck supple.   Skin:     General: Skin is warm and dry.   Neurological:      General: No focal deficit present.      Mental Status: She is alert and oriented to person, place, and time. Mental status is at baseline.   Psychiatric:         Mood and Affect: Mood normal.         Behavior: Behavior normal.         Thought Content: Thought content normal.         Judgment: Judgment normal.         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  Small right-sided pneumothorax.   2.  No pulmonary emboli.   3.  Trace right pleural effusion   Consider Di?n?gest to prevent recurrence         Current Assessment & Plan     Reviewed hospital notes, labs, and scans  Recent repeat CXR shows resolution of pneumothorax  24 Chest X-ray IMPRESSION:   Unremarkable exam with no evidence of acute radiographic abnormality and resolution of the previously seen small right pneumothorax.   [facility] - has appt with Dr Dorothy Sparks 24  [facility] - has appt with Dr Mata 24         Relevant Medications    albuterol 90 mcg/actuation inhaler        Family History   Problem Relation Name Age of Onset    No Known Problems Mother          F: Crohns, Melanoma     M: Lung CA small cell ( 32)     S: Anxiety/Depression     B: Crohns       No data recorded    No follow-ups on file.

## 2024-11-19 NOTE — ASSESSMENT & PLAN NOTE
Reviewed hospital notes, labs, and scans  Recent repeat CXR shows resolution of pneumothorax  11/12/24 Chest X-ray IMPRESSION:   Unremarkable exam with no evidence of acute radiographic abnormality and resolution of the previously seen small right pneumothorax.   [facility]ul[facility] - has appt with Dr Dorothy Sparks 12/4/24  [facility] - has appt with Dr Mata 12/18/24

## 2024-11-20 ENCOUNTER — PATIENT OUTREACH (OUTPATIENT)
Dept: PRIMARY CARE | Facility: CLINIC | Age: 29
End: 2024-11-20
Payer: COMMERCIAL

## 2024-11-25 ENCOUNTER — HOSPITAL ENCOUNTER (OUTPATIENT)
Dept: RADIOLOGY | Facility: EXTERNAL LOCATION | Age: 29
Discharge: HOME | End: 2024-11-25
Payer: COMMERCIAL

## 2024-11-25 DIAGNOSIS — R07.9 CHEST PAIN, UNSPECIFIED TYPE: ICD-10-CM

## 2025-01-21 ENCOUNTER — PATIENT OUTREACH (OUTPATIENT)
Dept: PRIMARY CARE | Facility: CLINIC | Age: 30
End: 2025-01-21
Payer: COMMERCIAL

## 2025-01-21 PROBLEM — N80.9 ENDOMETRIOSIS: Status: ACTIVE | Noted: 2025-01-21

## 2025-01-21 NOTE — PROGRESS NOTES
Discharge facility:[facility]  Discharge diagnosis: Right pneumothorax     Admission date: 1/16/25  Discharge date: 1/21/25    PCP Appointment Date: 1/22/25 with Charmaine  Specialist Appointment Date: 1/23/25 pulmonary, 1/28/25 GI, 2/17/25 derm,3-3-25 GYN  Hospital Encounter and Summary: Linked    See Discharge assessment below for further details    Medications  Medications reviewed with patient/caregiver?: Yes (1/21/2025 10:14 AM)  Is the patient having any side effects they believe may be caused by any medication additions or changes?: No (1/21/2025 10:14 AM)  Does the patient have all medications ordered at discharge?: Yes (1/21/2025 10:14 AM)  Care Management Interventions: Provided patient education (1/21/2025 10:14 AM)  Prescription Comments: no new prescriptions sent-see not below (1/21/2025 10:14 AM)  Is the patient taking all medications as directed (includes completed medication regime)?: Yes (1/21/2025 10:14 AM)  Care Management Interventions: Advised patient to call provider (Patient states she thought they were calling in birth control pills for her. She will call GYN today and try to clarify) (1/21/2025 10:14 AM)  Medication Comments: no new or changed medications (1/21/2025 10:14 AM)  Follow Up Tasks: -- (script for birth control or did OB/GYN send in?) (1/21/2025 10:14 AM)    Appointments  Does the patient have a primary care provider?: Yes (1/21/2025 10:14 AM)  Care Management Interventions: Verified appointment date/time/provider (TCM scheduled) (1/21/2025 10:14 AM)  Has the patient kept scheduled appointments due by today?: Yes (1/21/2025 10:14 AM)  Care Management Interventions: Advised patient to keep appointment; Educated on importance of keeping appointment (1/21/2025 10:14 AM)  Follow Up Tasks: -- (n/a) (1/21/2025 10:14 AM)    Self Management  What is the home health agency?: n/a (1/21/2025 10:14 AM)  Has home health visited the patient within 72 hours of discharge?:  Not applicable (1/21/2025 10:14 AM)  Home Health Interventions: -- (n/a) (1/21/2025 10:14 AM)  What Durable Medical Equipment (DME) was ordered?: n/a (1/21/2025 10:14 AM)  Has all Durable Medical Equipment (DME) been delivered?: -- (n/a) (1/21/2025 10:14 AM)  DME Interventions: -- (n/a) (1/21/2025 10:14 AM)  Care Management Interventions: Notified PCP/provider (1/21/2025 10:14 AM)  Follow Up Tasks: -- (n/a) (1/21/2025 10:14 AM)    Patient Teaching  Does the patient have access to their discharge instructions?: Yes (1/21/2025 10:14 AM)  Care Management Interventions: Reviewed instructions with patient (1/21/2025 10:14 AM)  What is the patient's perception of their health status since discharge?: Improving (1/21/2025 10:14 AM)  Is the patient/caregiver able to teach back the hierarchy of who to call/visit for symptoms/problems? PCP, Specialist, Home Health nurse, Urgent Care, ED, 911: Yes (1/21/2025 10:14 AM)  Patient/Caregiver Education Comments: Instructed on hospital discharge instructions. Instructed on new and changed medications. Instructed if increased shortness of breath or chest pains to call 911. Provided my contact information and encouraged to call with any questions (1/21/2025 10:14 AM)    Wrap Up  Wrap Up Additional Comments: Spoke with patient. She denies any further shortness of breath. She states she is feeling better. She thought birth control pills were going to be sent for her but they were not. Encouraged her to call [facility]/gyn to inquire and she can discuss further at tomorrow's appt.  She preferred appt tomorrow for follow up at PCP office. She also has MRI scheduled for tomorrow afternoon. She has no questions or concerns at this time. (1/21/2025 10:14 AM)

## 2025-01-22 ENCOUNTER — OFFICE VISIT (OUTPATIENT)
Dept: PRIMARY CARE | Facility: CLINIC | Age: 30
End: 2025-01-22
Payer: COMMERCIAL

## 2025-01-22 VITALS
DIASTOLIC BLOOD PRESSURE: 68 MMHG | TEMPERATURE: 97.8 F | HEART RATE: 83 BPM | OXYGEN SATURATION: 98 % | WEIGHT: 141 LBS | BODY MASS INDEX: 21.37 KG/M2 | HEIGHT: 68 IN | SYSTOLIC BLOOD PRESSURE: 101 MMHG

## 2025-01-22 DIAGNOSIS — Z78.9 TRANSITION OF CARE: Primary | ICD-10-CM

## 2025-01-22 DIAGNOSIS — Z87.09 H/O PNEUMOTHORAX: ICD-10-CM

## 2025-01-22 DIAGNOSIS — N80.9 ENDOMETRIOSIS: ICD-10-CM

## 2025-01-22 PROCEDURE — 3008F BODY MASS INDEX DOCD: CPT | Performed by: NURSE PRACTITIONER

## 2025-01-22 PROCEDURE — 99496 TRANSJ CARE MGMT HIGH F2F 7D: CPT | Performed by: NURSE PRACTITIONER

## 2025-01-22 PROCEDURE — 1036F TOBACCO NON-USER: CPT | Performed by: NURSE PRACTITIONER

## 2025-01-22 RX ORDER — NORETHINDRONE 5 MG/1
5 TABLET ORAL
COMMUNITY
Start: 2025-01-20 | End: 2025-01-22 | Stop reason: SDUPTHER

## 2025-01-22 RX ORDER — NORETHINDRONE 5 MG/1
TABLET ORAL
Qty: 100 TABLET | Refills: 3 | Status: SHIPPED | OUTPATIENT
Start: 2025-01-22 | End: 2025-01-24

## 2025-01-22 ASSESSMENT — PATIENT HEALTH QUESTIONNAIRE - PHQ9
1. LITTLE INTEREST OR PLEASURE IN DOING THINGS: NOT AT ALL
SUM OF ALL RESPONSES TO PHQ9 QUESTIONS 1 AND 2: 0
2. FEELING DOWN, DEPRESSED OR HOPELESS: NOT AT ALL

## 2025-01-22 NOTE — PATIENT INSTRUCTIONS
[facility]    Confirm dosing with Dr Adolfo Logan take 5 mg/day for 14 days; increase at increments of 2.5 mg/day every 2 weeks to reach 15 mg/day; continue for 6 months

## 2025-01-22 NOTE — PROGRESS NOTES
"Patient: Lorrie Callahan  : 1995  PCP: Shannon Lomax MD  MRN: [id number]  Program: Transitional Care Management  Status: Enrolled  Effective Dates: 2025 - present  Responsible Staff: Danyell Oates RN  Social Drivers to be Addressed: Alcohol Use, Financial Resource Strain, Physical Activity, Social Connections, Stress         Lorrie Callahan is a 29 y.o. female presenting today for follow-up after being discharged from the hospital 2 days ago. The main problem requiring admission was Pneumothorax  . The discharge summary and/or Transitional Care Management documentation was reviewed. Medication reconciliation was performed as indicated via the \"Ruel as Reviewed\" timestamp.     Lorrie Callahan was contacted by Transitional Care Management services two days after her discharge. This encounter and supporting documentation was reviewed.    Right upper chest pain to shoulder and scapula  Dull 2-3/10  Always present  MRI was done too late at hospital  Pneumothorax resolved  Did not receive Aygestin by [facility]  Is going to try and get pregnant this month  If unsuccessful, start aygestin  Wants another child    Hospital course copied:  This is a 29 year old female who presents with with pmh of crohns, pneumothorax, and anemia presents to  from outpatient office for recurrent RIGHT pneumothorax on CT accompanied by chest pain. Patient complains of chest pain episodes during times of menses. Previous pneumothorax in 2024 requiring chest tube followed by resolution of pneumothroax. Patient currently with menses. Patient following up with pulmonology to discuss CT chest which showed \"SMALL RECURRENT RIGHT HYDROPNEUMOTHORAX WITH ASSOCIATED MILD RIGHT BASILAR ATELECTASIS/INFILTRATE, NEW SINCE 2024\". CXR in ED with RIGHT apical pneumothorax 3cm. This AM CXR with improved pneumothorax of 2.1 cm pneumothorax. Patient currently stable on 3-4L NC. Patient reports she hasn't followed up witht OB/GYN. "     1/18 - Patient on 3-4L NC in no acute distress. CXR continues to show daily improvement. Currently 1.5 cm pneumo upon personal measurement this AM. Patient awaiting MRI chest    1/19 - Patient in no acute distress on 3L NC. Awaiting AM CXR and MRI chest. Will discharge after MRI if performed today    1/20: JONAS overnight. Pneumothorax resolved on CXR. MRI completed this morning. Patient medically cleared for discharge. Patient to discharge home with no needs  * What were the Active Issues: n/a  * Hospital Course Complicated by: n/a  * Extended Hospital Stay Due to: inability to obtain MRI  * Specific Medication Changes: none  * Surgical Pathology/Microbiology: none  * Pain: stable  * Surgical Incisions/ Wounds: none  * Tubes/Lines/Drains on Discharge: none  * Patient Condition at Discharge: Stable  * Disposition:    Home with Self Care    Labs Copied:  See [facility] labs    Imaging Copied:    CT chest wo IV contrast  Order: [id number]  Impression    IMPRESSION:    SMALL RECURRENT RIGHT HYDROPNEUMOTHORAX WITH ASSOCIATED MILD RIGHT  BASILAR ATELECTASIS/INFILTRATE, NEW SINCE 12/05/2024.    COMMUNICATION:  COMMUNICATED WITH JANET COLVIN ON 1/16/2025 10:12 AM    VIA EPIC STAFF MESSAGE OR PHONE MESSAGE BY IMAGING SERVICES NAVIGATOR.    RESULT:    Limitations:  None.    Lines, tubes, and devices:  None.    Lung parenchyma and Pleural space: There is small recurrent right  hydropneumothorax with associated mild right basilar  atelectasis/infiltrate, new since 12/05/2024.    Lower neck, lymph nodes, and mediastinum:  The imaged thyroid gland is  normal.  No lymphadenopathy in the supraclavicular, axillary,  mediastinal, or hilar regions.    Heart, pericardium, and thoracic vessels:  The thoracic aorta and main  pulmonary artery are normal in caliber. The cardiac chambers are normal  in size. No coronary artery atherosclerotic calcifications are noted,  although the study is not optimized for coronary assessment. No  pericardial  "effusion or thickening.    Bones and soft tissues:  No destructive bone lesion. Chest wall is  unremarkable.    Upper abdomen:  No abnormality in the imaged upper abdomen.    Localizer images: No additional findings.  Exam End: 01/16/25 08:23    Specimen Collected: 01/16/25 08:23 Last Resulted: 01/16/25 10:34       MRI CHEST WALL/RIB WO/W IVCON RIGHT  Order: [id number]  Impression    IMPRESSION:    Trace right pleural effusion.  Interval resolution of right pneumothorax.   Otherwise no definite intrathoracic endometriosis identified.    Result:    No discrete endometrial deposits identified in the thorax including the  diaphragm.    The chest wall is normal.  Trace right pleural effusion.  Limited imaging  through the lungs otherwise reveals no gross abnormalities.    No significant lymphadenopathy is noted.    The cardiac chamber sizes are normal.The thoracic aorta is normal in  size. The arch vessel branching pattern is normal, and all of the arch  branch vessels appear widely patent in their proximal portions.  The  central pulmonary arteries are normal.  No pericardial effusion is noted.    Limited imaging through the upper abdomen reveals no abnormalities of the  visualized organs.  Exam End: 01/20/25 11:07    Specimen Collected: 01/20/25 11:07 Last Resulted: 01/20/25 11:53   [facility] From[facility]  Result Received: 01/21/25 07:23     Review of Systems  ROS completely negative except what was mentioned in the HPI.  Problem List, surgical, social, and family histories which were reviewed and updated as necessary within the EMR. I also personally reviewed the notes, labs, and imaging that pertained to what was documented or discussed in the HPI.    /68   Pulse 83   Temp 36.6 °C (97.8 °F) (Temporal)   Ht 1.727 m (5' 8\")   Wt 64 kg (141 lb)   SpO2 98%   BMI 21.44 kg/m²     Physical Exam  Vitals and nursing note reviewed.   Constitutional:       General: She is not in acute distress.     " Appearance: Normal appearance.   HENT:      Head: Normocephalic and atraumatic.      Right Ear: External ear normal.      Left Ear: External ear normal.      Nose: Nose normal.      Mouth/Throat:      Mouth: Mucous membranes are moist.   Eyes:      Extraocular Movements: Extraocular movements intact.      Conjunctiva/sclera: Conjunctivae normal.      Pupils: Pupils are equal, round, and reactive to light.   Cardiovascular:      Rate and Rhythm: Normal rate and regular rhythm.      Heart sounds: Normal heart sounds.   Pulmonary:      Effort: Pulmonary effort is normal.      Breath sounds: Normal breath sounds.   Musculoskeletal:         General: Normal range of motion.      Cervical back: Normal range of motion and neck supple.   Skin:     General: Skin is warm and dry.   Neurological:      General: No focal deficit present.      Mental Status: She is alert and oriented to person, place, and time. Mental status is at baseline.   Psychiatric:         Mood and Affect: Mood normal. Affect is tearful.         Behavior: Behavior normal.         Thought Content: Thought content normal.         Judgment: Judgment normal.         The complexity of medical decision making for this patient's transitional care is moderate.    Assessment/Plan   Problem List Items Addressed This Visit       Endometriosis    Overview     H/O Catemenial Pneumothorax, multiple  S/p [facility] care conference 1/25: Recommendations from the group include:  -suppression with aygestin 5mg for 3 months  -then can attempt to conceive  -with another CP, she might need evaluation for surgical management  -given MRI chest has evaluated diaphragm, MRI pelvis will suffice          Current Assessment & Plan     Did not receive aygestin  Reordered and sent  She will follow up with them on dosing - Aygestin take 5 mg/day for 14 days; increase at increments of 2.5 mg/day every 2 weeks to reach 15 mg/day; continue for 6 months  Discussed counseling         Relevant  Medications    norethindrone (Aygestin) 5 mg tablet    H/O pneumothorax    Overview     Likely catamenial pneumothorax (pneumothorax occurring in association with menses due to thoracic endometriosis )    Ctchest 11/24: 1.  Small right-sided pneumothorax. 2.  No pulmonary emboli. 3.  Trace right pleural effusion      Consider Di?n?gest to prevent recurrence  Ctchest 1/16/25: SMALL RECURRENT RIGHT HYDROPNEUMOTHORAX WITH ASSOCIATED MILD RIGHT BASILAR ATELECTASIS/INFILTRATE, NEW SINCE 12/05/2024.           Other Visit Diagnoses       Transition of care    -  Primary    Hospital notes, labs & scans reviewed as well as other officen notes pertaining to the diagnosis

## 2025-01-22 NOTE — ASSESSMENT & PLAN NOTE
Did not receive aygestin  Reordered and sent  She will follow up with them on dosing - Aygestin take 5 mg/day for 14 days; increase at increments of 2.5 mg/day every 2 weeks to reach 15 mg/day; continue for 6 months  Discussed counseling

## 2025-01-24 ENCOUNTER — TELEPHONE (OUTPATIENT)
Dept: PRIMARY CARE | Facility: CLINIC | Age: 30
End: 2025-01-24
Payer: COMMERCIAL

## 2025-01-24 NOTE — TELEPHONE ENCOUNTER
Per SF NP I called Pt to ask if Dr. Mata is going to do Prior Auth for [place] (norethindrone (Aygestin) 5 mg tablet) 5 mg or if she needs us to complete one for her. Pt stated that she will reach out to Dr. Mata today and call office back to relay if she will or if we need to.

## 2025-02-04 ENCOUNTER — PATIENT OUTREACH (OUTPATIENT)
Dept: PRIMARY CARE | Facility: CLINIC | Age: 30
End: 2025-02-04
Payer: COMMERCIAL

## 2025-02-04 NOTE — PROGRESS NOTES
Call regarding appt. with PCP on  1/22/25 after hospitalization.  At time of outreach call the patient feels as if their condition has improved . She states she is not experiencing no abnormal shortness of breath.   Reviewed the PCP appointment with the pt and addressed any questions or concerns.

## 2025-03-12 ENCOUNTER — PATIENT OUTREACH (OUTPATIENT)
Dept: PRIMARY CARE | Facility: CLINIC | Age: 30
End: 2025-03-12
Payer: COMMERCIAL

## 2025-04-09 ENCOUNTER — PATIENT OUTREACH (OUTPATIENT)
Dept: PRIMARY CARE | Facility: CLINIC | Age: 30
End: 2025-04-09
Payer: COMMERCIAL

## 2025-06-30 DIAGNOSIS — R04.0 NASAL BLEEDING: ICD-10-CM

## 2025-06-30 DIAGNOSIS — Z98.890 H/O OF NASAL CAUTERIZATION: ICD-10-CM

## 2025-07-17 ENCOUNTER — APPOINTMENT (OUTPATIENT)
Dept: RADIOLOGY | Facility: HOSPITAL | Age: 30
End: 2025-07-17
Payer: COMMERCIAL

## 2025-07-17 DIAGNOSIS — M54.2 CERVICAL PAIN (NECK): ICD-10-CM

## 2025-07-17 DIAGNOSIS — M54.6 THORACIC SPINE PAIN: ICD-10-CM

## 2025-07-17 PROCEDURE — 72141 MRI NECK SPINE W/O DYE: CPT

## 2025-07-17 PROCEDURE — 72146 MRI CHEST SPINE W/O DYE: CPT

## 2025-07-21 DIAGNOSIS — M46.90 AXIAL SPONDYLOARTHRITIS: Primary | ICD-10-CM

## 2025-07-21 DIAGNOSIS — F41.9 ANXIETY: ICD-10-CM

## 2025-07-21 DIAGNOSIS — E55.9 VITAMIN D DEFICIENCY: ICD-10-CM

## 2025-07-21 DIAGNOSIS — Z00.00 ROUTINE ADULT HEALTH MAINTENANCE: ICD-10-CM

## 2025-07-21 PROBLEM — D18.09 HEMANGIOMA OF SPINE: Status: ACTIVE | Noted: 2025-07-21

## 2025-07-21 PROBLEM — M50.90 CERVICAL DISC DISEASE: Status: ACTIVE | Noted: 2025-07-21

## 2025-07-22 DIAGNOSIS — D18.09 HEMANGIOMA OF SPINE: ICD-10-CM

## 2025-07-28 ENCOUNTER — APPOINTMENT (OUTPATIENT)
Dept: OTOLARYNGOLOGY | Facility: CLINIC | Age: 30
End: 2025-07-28
Payer: COMMERCIAL

## 2025-07-28 VITALS — BODY MASS INDEX: 23.04 KG/M2 | WEIGHT: 152 LBS | HEIGHT: 68 IN

## 2025-07-28 DIAGNOSIS — R04.0 EPISTAXIS: Primary | ICD-10-CM

## 2025-07-28 PROCEDURE — 3008F BODY MASS INDEX DOCD: CPT | Performed by: OTOLARYNGOLOGY

## 2025-07-28 PROCEDURE — 30901 CONTROL OF NOSEBLEED: CPT | Performed by: OTOLARYNGOLOGY

## 2025-07-28 RX ORDER — MUPIROCIN 20 MG/G
OINTMENT TOPICAL
Qty: 22 G | Refills: 2 | Status: SHIPPED | OUTPATIENT
Start: 2025-07-28 | End: 2025-08-27

## 2025-07-28 NOTE — PROGRESS NOTES
Subjective   Patient ID: Lorrie Callahan is a 29 y.o. female who presents for No chief complaint on file..    HPI  New patient being seen for epistaxis. This has been present on left side. All remaining head neck inquiry otherwise negative.     Review of Systems   Constitutional: Negative.    HENT: Negative.     Respiratory: Negative.     Cardiovascular: Negative.    Neurological: Negative.      Physical Exam  General appearance: No acute distress. Normal facies. Symmetric facial movement. No gross lesions of the face are noted.  Ears:  The external ear structures appear normal. The ear canals patent and the tympanic membranes are intact without evidence of air-fluid levels, retraction, or congenital defects.    Nose:  Anterior rhinoscopy notes essentially a midline nasal septum with evidence of historical bleed left. Examination is noted for normal healthy mucosal membranes without any evidence of lesions, polyps, or exudate.   Throat/Oral mucosa:  The tongue is normally mobile. There are no lesions on the gingiva, buccal, or oral mucosa. There are no oral cavity masses.  Neck:  The neck is negative for mass lymphadenopathy. The trachea and parotid are clear. The thyroid bed is grossly unremarkable. The salivary gland structures are grossly unremarkable.    Procedure: Nasal cautery  Indication: Epistaxis  Risks, benefits, alternatives, and expectations discussed with patient and patient wishes to proceed.    After topical Lidocaine/Afrin was applied, prominent vessels at the left anterior nasal septum were cauterized using silver nitrate. Bacitracin ointment was then applied. Patient tolerated procedure well. No active bleeding noted following procedure.    Assessment/Plan   Epistaxis, cauterized using silver nitrate today. Patient encouraged to avoid blowing nose, sneeze with mouth open for next 4-5 days. Recommend follow-up with me as needed.

## 2025-08-01 ENCOUNTER — SPECIALTY PHARMACY (OUTPATIENT)
Dept: PHARMACY | Facility: CLINIC | Age: 30
End: 2025-08-01

## 2025-08-01 DIAGNOSIS — R73.09 ABNORMAL GLUCOSE: ICD-10-CM

## 2025-08-01 LAB
25(OH)D3+25(OH)D2 SERPL-MCNC: 65 NG/ML (ref 30–100)
ALBUMIN SERPL-MCNC: 4.3 G/DL (ref 3.6–5.1)
ALP SERPL-CCNC: 37 U/L (ref 31–125)
ALT SERPL-CCNC: 9 U/L (ref 6–29)
ANION GAP SERPL CALCULATED.4IONS-SCNC: 11 MMOL/L (CALC) (ref 7–17)
APPEARANCE UR: CLEAR
AST SERPL-CCNC: 12 U/L (ref 10–30)
BACTERIA #/AREA URNS HPF: ABNORMAL /HPF
BASOPHILS # BLD AUTO: 41 CELLS/UL (ref 0–200)
BASOPHILS NFR BLD AUTO: 0.5 %
BILIRUB SERPL-MCNC: 0.4 MG/DL (ref 0.2–1.2)
BILIRUB UR QL STRIP: NEGATIVE
BUN SERPL-MCNC: 12 MG/DL (ref 7–25)
CALCIUM SERPL-MCNC: 8.8 MG/DL (ref 8.6–10.2)
CHLORIDE SERPL-SCNC: 104 MMOL/L (ref 98–110)
CO2 SERPL-SCNC: 22 MMOL/L (ref 20–32)
COLOR UR: YELLOW
CREAT SERPL-MCNC: 0.77 MG/DL (ref 0.5–0.97)
EGFRCR SERPLBLD CKD-EPI 2021: 106 ML/MIN/1.73M2
EOSINOPHIL # BLD AUTO: 251 CELLS/UL (ref 15–500)
EOSINOPHIL NFR BLD AUTO: 3.1 %
ERYTHROCYTE [DISTWIDTH] IN BLOOD BY AUTOMATED COUNT: 12.8 % (ref 11–15)
GLUCOSE SERPL-MCNC: 131 MG/DL (ref 65–99)
GLUCOSE UR QL STRIP: NEGATIVE
HCT VFR BLD AUTO: 40.1 % (ref 35–45)
HGB BLD-MCNC: 12.9 G/DL (ref 11.7–15.5)
HGB UR QL STRIP: ABNORMAL
HLA-B27 QL NAA+PROBE: NEGATIVE
HYALINE CASTS #/AREA URNS LPF: ABNORMAL /LPF
KETONES UR QL STRIP: NEGATIVE
LEUKOCYTE ESTERASE UR QL STRIP: ABNORMAL
LYMPHOCYTES # BLD AUTO: 2462 CELLS/UL (ref 850–3900)
LYMPHOCYTES NFR BLD AUTO: 30.4 %
MCH RBC QN AUTO: 29.9 PG (ref 27–33)
MCHC RBC AUTO-ENTMCNC: 32.2 G/DL (ref 32–36)
MCV RBC AUTO: 93 FL (ref 80–100)
MONOCYTES # BLD AUTO: 486 CELLS/UL (ref 200–950)
MONOCYTES NFR BLD AUTO: 6 %
NEUTROPHILS # BLD AUTO: 4860 CELLS/UL (ref 1500–7800)
NEUTROPHILS NFR BLD AUTO: 60 %
NITRITE UR QL STRIP: NEGATIVE
PH UR STRIP: 6.5 [PH] (ref 5–8)
PLATELET # BLD AUTO: 248 THOUSAND/UL (ref 140–400)
PMV BLD REES-ECKER: 11 FL (ref 7.5–12.5)
POTASSIUM SERPL-SCNC: 4 MMOL/L (ref 3.5–5.3)
PROT SERPL-MCNC: 7.6 G/DL (ref 6.1–8.1)
PROT UR QL STRIP: NEGATIVE
QUEST HLAB27 TYPING RESULTS REVIEWED BY:: NORMAL
RBC # BLD AUTO: 4.31 MILLION/UL (ref 3.8–5.1)
RBC #/AREA URNS HPF: ABNORMAL /HPF
SERVICE CMNT-IMP: ABNORMAL
SODIUM SERPL-SCNC: 137 MMOL/L (ref 135–146)
SP GR UR STRIP: 1 (ref 1–1.03)
SQUAMOUS #/AREA URNS HPF: ABNORMAL /HPF
TSH SERPL-ACNC: 2.47 MIU/L
WBC # BLD AUTO: 8.1 THOUSAND/UL (ref 3.8–10.8)
WBC #/AREA URNS HPF: ABNORMAL /HPF

## 2025-08-04 ENCOUNTER — APPOINTMENT (OUTPATIENT)
Dept: OTOLARYNGOLOGY | Facility: CLINIC | Age: 30
End: 2025-08-04
Payer: COMMERCIAL

## 2025-08-04 DIAGNOSIS — R04.0 EPISTAXIS: Primary | ICD-10-CM

## 2025-08-04 PROCEDURE — 99213 OFFICE O/P EST LOW 20 MIN: CPT | Performed by: OTOLARYNGOLOGY

## 2025-08-04 NOTE — PROGRESS NOTES
Subjective   Patient ID: Lorrie Callahan is a 29 y.o. female who presents for Epistaxis (Nose Bleed).    HPI  The patient returns, being seen for recurrent left epistaxis. Left anterior nasal septum epistaxis was cauterized 1 week ago and unfortunately she has been having increased bleeding on that side since then.     All remaining head neck inquiry otherwise negative.     Physical Exam  Nose:  Anterior rhinoscopy notes essentially a midline nasal septum. Examination is noted for normal healthy mucosal membranes without any evidence of lesions, polyps, or exudate. Prominent hemangioma on left anterior nasal septum.     Assessment/Plan   29yr old female presents for follow-up on left epistaxis. Unfortunately has been having continued nosebleeds since cauterization using silver nitrate last week. Prominent hemangioma noted left anterior nasal septum today, for which I would recommend bipolar cauterization in the OR. Detailed discussion regarding this with patient today, including risks, benefits, and alternatives. She understands and agrees to proceed and we will get her scheduled for several days from today.

## 2025-08-05 ENCOUNTER — HOSPITAL ENCOUNTER (OUTPATIENT)
Facility: HOSPITAL | Age: 30
Setting detail: OUTPATIENT SURGERY
End: 2025-08-05
Attending: OTOLARYNGOLOGY | Admitting: OTOLARYNGOLOGY
Payer: COMMERCIAL

## 2025-08-05 ENCOUNTER — SPECIALTY PHARMACY (OUTPATIENT)
Dept: PHARMACY | Facility: CLINIC | Age: 30
End: 2025-08-05

## 2025-08-05 VITALS — BODY MASS INDEX: 23.04 KG/M2 | WEIGHT: 152 LBS | HEIGHT: 68 IN

## 2025-08-05 PROCEDURE — RXMED WILLOW AMBULATORY MEDICATION CHARGE

## 2025-08-05 RX ORDER — NORETHINDRONE 5 MG/1
10 TABLET ORAL DAILY
COMMUNITY

## 2025-08-05 NOTE — PREPROCEDURE INSTRUCTIONS

## 2025-08-06 ENCOUNTER — PHARMACY VISIT (OUTPATIENT)
Dept: PHARMACY | Facility: CLINIC | Age: 30
End: 2025-08-06
Payer: COMMERCIAL

## 2025-08-08 ENCOUNTER — SPECIALTY PHARMACY (OUTPATIENT)
Dept: PHARMACY | Facility: CLINIC | Age: 30
End: 2025-08-08

## 2025-08-08 NOTE — PROGRESS NOTES
[facility] Clinical Note  Initial Patient Education     Introduction  Lorrie Callahan is a 29 y.o. female who is on the specialty pharmacy service for management of: Gastroenterology Core.    Lorrie Callahan is initiating the following therapy: adalimumab-adaz 40 mg under the skin once weekly       Medication receipt date: 8/8/25   (Delivery was scheduled 8/7 and was picked up by external shipping company on 8/7 but for next day delivery on 8/8. Spoke with pharmacy team to confirm stability of product. Verified medication will be delivered 8/8 in proper temperature storage. Relayed this information to patient.)    Duration of therapy: Maintenance    The most recent encounter visit with the referring prescriber Lex Fair MD  on 7/28/25 was reviewed.  Pharmacy will continue to collaborate in the care of this patient with the referring prescriber.    Clinical Background  An initial assessment was conducted prior to first fill of the medication to determine the appropriateness of therapy given the patient's diagnosis, medication list, comorbidities, allergies, medical history, patient's ability to self administer medication, and therapeutic goals based on possible outcomes of therapy. Refer to initial assessment task completed on 8/5/25.    Labs for clinical appropriateness that were reviewed include:   Gastroenterology - CBC-diff:   Lab Results   Component Value Date    WBC 8.1 07/29/2025    RBC 4.31 07/29/2025    HGB 12.9 07/29/2025    HCT 40.1 07/29/2025    MCV 93.0 07/29/2025    MCHC 32.2 07/29/2025     07/29/2025    RDW 12.8 07/29/2025    NEUTOPHILPCT 54.0 10/15/2021    IGPCT 0.2 10/15/2021    LYMPHOPCT 30.4 07/29/2025    MONOPCT 6.0 07/29/2025    EOSPCT 3.1 07/29/2025    BASOPCT 0.5 07/29/2025    NEUTROABS 2.88 10/15/2021    LYMPHSABS 1.58 10/15/2021    MONOSABS 0.52 10/15/2021    EOSABS 251 07/29/2025    BASOSABS 41 07/29/2025   , CMP:   Lab Results   Component  Value Date    GLUCOSE 131 (H) 07/29/2025     07/29/2025    K 4.0 07/29/2025     07/29/2025    CO2 22 07/29/2025    ANIONGAP 11 07/29/2025    BUN 12 07/29/2025    CREATININE 0.77 07/29/2025    CALCIUM 8.8 07/29/2025    ALBUMIN 4.3 07/29/2025    ALKPHOS 37 07/29/2025    PROT 7.6 07/29/2025    AST 12 07/29/2025    BILITOT 0.4 07/29/2025    ALT 9 07/29/2025     BLOOD TB SCREEN  Order: [id number]  Component  Ref Range & Units 1 yr ago   TB Nil  <=8.00 IU/mL 0.05   TB1 Ag minus Nil  <0.35 IU/mL <0.00   TB2 Ag minus Nil  <0.35 IU/mL 0.00   TB Result Negative   Mitogen minus Nil  >=0.50 IU/mL >9.95   TB Gamma Interpretation Infection with M. tuberculosis complex is unlikely. If latent tuberculosis infection is highly suspected, a negative result does not rule out the infection. Specimens from immunocompromised patients and those <5 years of age may show false negative results. In case of a contact investigation, please repeat 8-12 weeks after a known exposure.   Resulting Agency [facility]     Specimen Collected: 12/22/23  7:10 AM    Performed by: [facility] Last Resulted: 12/23/23  8:23 PM     HEPATITIS B CORE ANTIBODY TOTAL  Order: [id number]  Component  Ref Range & Units 7 mo ago   Hepatitis B Core Ab, Total  Negative Negative   HEPATITIS B SURFACE ANTIGEN  Order: [id number]  Component  Ref Range & Units 7 mo ago   HBsAg  Negative Negative     Education/Discussion  Lorrie was contacted on 8/8/2025 at 10:56 AM for a pharmacy visit with encounter number [id number] from:    [facility]  [address] [place] [id number]  Dept: [phone]  Dept Fax: [phone]  Lorrie consented to a/an Telephone visit, which was performed.    Medication Start Date (planned or actual): 8/8/25 (Patient was due for dose on 8/7, but due to shipping issue will be taking today once it arrives)         Education was conducted prior to start of  therapy? Yes    Education discussed includes the following:  Patient Education  Counseled the Patient on the Following : Pharmacy contact information  Learner: Patient  Education Method: Explanation  Education Response: Verbalizes understanding  Additional details of the medication specific counseling are found within the linked patient education flowsheet.     Patient is new to [facility], but has been getting this medication from an external pharmacy and therefore is not new to this medication. The patient declined most of pharmacist initial education at this time due to their comfort with the medication. Advised patient to please contact pharmacy if any question or concerns arise.     The follow up timeline was discussed. Every person responds to and reacts to therapy differently. Patient should be assessed for efficacy and tolerability in approximately: 3 months    Provided education on goals and possible outcomes of therapy:  Adherence with therapy  Timely completion of appropriate labs  Timely and appropriate follow up with provider  Identify and address medication interactions with presciption medications, OTC medications and supplements  Optimize or maintain quality of life  Gastroenterology: Improve gastrointestinal clinical symptoms such as abdominal pain, inflammation, diarrhea, constipation, and bleeding, Reduce frequency and urgency of bowel movements, and Allow for GI mucosal healing (observed through endoscopy and colonoscopy)           The importance of adherence was discussed and they were advised to take the medication as prescribed by their provider.         Impression/Plan  Review and Assessment   Reviewed During This Encounter: Allergies, Medications, Problem list  Medications Assessed for Appropriate Use, Dose, Route, Frequency, and Duration: Yes  Medication Reconciliation Completed: Yes  Drug Interactions Evaluated: Yes  Clinically Relevant Drug Interactions  Identified: No    This patient has not been identified as high risk due to Lack of high risk qualifiers.  The following action was taken: N/A.      The  Specialty Pharmacy Welcome packet may be viewed here:   Specialty Pharmacy Welcome Packet     Or by scanning QR code:      Provided contact information ([phone]) for [facility] and reviewed dispensing process, refill timeline and patient management follow up. Advised to contact the pharmacy if there are any adverse effects and/or changes to medication list, including prescriptions, OTC medications, herbal products, or supplements. Confirmed understanding of education conducted during assessment. All questions and concerns were addressed and patient was encouraged to reach out for additional questions or concerns.      KIYA BARRON, EkaterinaD

## 2025-08-20 ENCOUNTER — APPOINTMENT (OUTPATIENT)
Dept: OTOLARYNGOLOGY | Facility: CLINIC | Age: 30
End: 2025-08-20
Payer: COMMERCIAL

## 2025-08-27 ENCOUNTER — APPOINTMENT (OUTPATIENT)
Dept: OTOLARYNGOLOGY | Facility: CLINIC | Age: 30
End: 2025-08-27
Payer: COMMERCIAL

## 2025-08-27 DIAGNOSIS — R04.0 EPISTAXIS: Primary | ICD-10-CM

## 2025-08-27 PROCEDURE — 99024 POSTOP FOLLOW-UP VISIT: CPT | Performed by: OTOLARYNGOLOGY

## 2025-08-28 PROCEDURE — RXMED WILLOW AMBULATORY MEDICATION CHARGE

## 2025-09-04 ENCOUNTER — PHARMACY VISIT (OUTPATIENT)
Dept: PHARMACY | Facility: CLINIC | Age: 30
End: 2025-09-04
Payer: COMMERCIAL

## 2025-09-16 ENCOUNTER — APPOINTMENT (OUTPATIENT)
Dept: PRIMARY CARE | Facility: CLINIC | Age: 30
End: 2025-09-16
Payer: COMMERCIAL

## 2025-10-09 ENCOUNTER — APPOINTMENT (OUTPATIENT)
Dept: ORTHOPEDIC SURGERY | Facility: CLINIC | Age: 30
End: 2025-10-09
Payer: COMMERCIAL